# Patient Record
Sex: MALE | Race: WHITE | NOT HISPANIC OR LATINO | Employment: OTHER | URBAN - NONMETROPOLITAN AREA
[De-identification: names, ages, dates, MRNs, and addresses within clinical notes are randomized per-mention and may not be internally consistent; named-entity substitution may affect disease eponyms.]

---

## 2024-05-30 ENCOUNTER — OFFICE VISIT (OUTPATIENT)
Dept: SURGERY | Facility: CLINIC | Age: 64
End: 2024-05-30
Payer: COMMERCIAL

## 2024-05-30 ENCOUNTER — PATIENT ROUNDING (BHMG ONLY) (OUTPATIENT)
Dept: SURGERY | Facility: CLINIC | Age: 64
End: 2024-05-30
Payer: COMMERCIAL

## 2024-05-30 ENCOUNTER — HOSPITAL ENCOUNTER (OUTPATIENT)
Dept: CT IMAGING | Facility: HOSPITAL | Age: 64
Discharge: HOME | End: 2024-05-30
Admitting: SURGERY
Payer: COMMERCIAL

## 2024-05-30 ENCOUNTER — DOCUMENTATION (OUTPATIENT)
Dept: SURGERY | Facility: CLINIC | Age: 64
End: 2024-05-30

## 2024-05-30 VITALS
DIASTOLIC BLOOD PRESSURE: 83 MMHG | BODY MASS INDEX: 31.36 KG/M2 | HEIGHT: 71 IN | HEART RATE: 63 BPM | WEIGHT: 224 LBS | OXYGEN SATURATION: 98 % | SYSTOLIC BLOOD PRESSURE: 169 MMHG

## 2024-05-30 DIAGNOSIS — R10.32 INGUINODYNIA, BILATERAL: ICD-10-CM

## 2024-05-30 DIAGNOSIS — K40.20 BILATERAL INGUINAL HERNIA WITHOUT OBSTRUCTION OR GANGRENE, RECURRENCE NOT SPECIFIED: ICD-10-CM

## 2024-05-30 DIAGNOSIS — K40.20 BILATERAL INGUINAL HERNIA WITHOUT OBSTRUCTION OR GANGRENE, RECURRENCE NOT SPECIFIED: Primary | ICD-10-CM

## 2024-05-30 DIAGNOSIS — R10.31 INGUINODYNIA, BILATERAL: ICD-10-CM

## 2024-05-30 DIAGNOSIS — E66.9 CLASS 1 OBESITY WITH BODY MASS INDEX (BMI) OF 31.0 TO 31.9 IN ADULT, UNSPECIFIED OBESITY TYPE, UNSPECIFIED WHETHER SERIOUS COMORBIDITY PRESENT: ICD-10-CM

## 2024-05-30 DIAGNOSIS — K40.21 BILATERAL RECURRENT INGUINAL HERNIA WITHOUT OBSTRUCTION OR GANGRENE: ICD-10-CM

## 2024-05-30 LAB — CREAT BLDA-MCNC: 1.1 MG/DL (ref 0.6–1.3)

## 2024-05-30 PROCEDURE — 74177 CT ABD & PELVIS W/CONTRAST: CPT

## 2024-05-30 PROCEDURE — 82565 ASSAY OF CREATININE: CPT

## 2024-05-30 PROCEDURE — 25510000001 IOPAMIDOL 61 % SOLUTION: Performed by: SURGERY

## 2024-05-30 RX ORDER — FLUTICASONE PROPIONATE 50 MCG
1 SPRAY, SUSPENSION (ML) NASAL DAILY
COMMUNITY
Start: 2024-03-11

## 2024-05-30 RX ORDER — OMEPRAZOLE 20 MG/1
20 CAPSULE, DELAYED RELEASE ORAL DAILY
COMMUNITY
Start: 2024-03-11

## 2024-05-30 RX ORDER — MAGNESIUM OXIDE 400 MG/1
400 TABLET ORAL DAILY
COMMUNITY

## 2024-05-30 RX ORDER — ERGOCALCIFEROL (VITAMIN D2) 10 MCG
400 TABLET ORAL DAILY
COMMUNITY

## 2024-05-30 RX ORDER — SIMVASTATIN 20 MG
1 TABLET ORAL DAILY
COMMUNITY
Start: 2024-03-11 | End: 2024-06-09

## 2024-05-30 RX ORDER — ASPIRIN 81 MG/1
1 TABLET ORAL DAILY
COMMUNITY
Start: 2024-03-11 | End: 2024-06-09

## 2024-05-30 RX ORDER — DIPHENOXYLATE HYDROCHLORIDE AND ATROPINE SULFATE 2.5; .025 MG/1; MG/1
1 TABLET ORAL DAILY
COMMUNITY
Start: 2024-03-11 | End: 2024-06-09

## 2024-05-30 RX ORDER — PHENOL 1.4 %
600 AEROSOL, SPRAY (ML) MUCOUS MEMBRANE DAILY
COMMUNITY

## 2024-05-30 RX ORDER — IBUPROFEN 800 MG/1
800 TABLET ORAL EVERY 8 HOURS PRN
COMMUNITY
Start: 2024-03-11

## 2024-05-30 RX ADMIN — IOPAMIDOL 100 ML: 612 INJECTION, SOLUTION INTRAVENOUS at 13:15

## 2024-05-30 NOTE — PROGRESS NOTES
May 30, 2024    Hello, may I speak with Jose Alfredo Reyes?    My name is Mariela.      I am  with [facility]  [facility]  [address]  [facility] [id number] [phone].    Before we get started may I verify your date of birth? 1960    I am calling to officially welcome you to our practice and ask about your recent visit. Is this a good time to talk? yes    Tell me about your visit with us. What things went well?  Everyone was so nice and welcoming. Cyndee went above and beyond to help me with my insurance and get me scheduled. I truly appreciate her.       We're always looking for ways to make our patients' experiences even better. Do you have recommendations on ways we may improve?  no    Overall were you satisfied with your first visit to our practice? yes       I appreciate you taking the time to speak with me today. Is there anything else I can do for you? no      Thank you, and have a great day.

## 2024-06-01 NOTE — PROGRESS NOTES
Office New Patient History and Physical:     Referring Provider: Katarzyna Ortiz APRN  Primary Care Provider: Katarzyna Ortiz APRN    Chief Complaint   Patient presents with    Hernia       Subjective .       History of present illness:  Jose Alfredo Reyes is a 63 y.o. male who presents for bilateral inguinal pain after undergoing laparoscopic inguinal hernia repair in [place], [place]. He reports that he had a bilateral inguinal hernia repair last August, with a recurrent on the right side which required a revision repair in Nov. He also has pain on the left and believes that he has a mesh related complication causing the pain. He was told that he may have a meshoma. He reports that the pain worsens with exertion but rest does not completely alleviate the pain. The pain has significantly impacted his quality of life. He denies obstructive symptoms and no urinary changes.       History:  Past Medical History:   Diagnosis Date    High cholesterol     Hypertension    ,   Past Surgical History:   Procedure Laterality Date    HAND SURGERY Left     thumb    HERNIA REPAIR  2023    Aug and Nov    INCISION AND DRAINAGE HEMATOMA Left     UMBILICAL HERNIA REPAIR     , History reviewed. No pertinent family history.,   Social History     Tobacco Use    Smoking status: Former     Types: Cigarettes     Start date:      Quit date:      Years since quittin.4     Passive exposure: Past    Smokeless tobacco: Current   Vaping Use    Vaping status: Some Days    Substances: THC   Substance Use Topics    Alcohol use: Yes     Comment: Very rarely    Drug use: Yes     Frequency: 7.0 times per week     Types: Marijuana       Current Outpatient Medications:     aspirin 81 MG EC tablet, Take 1 tablet by mouth Daily., Disp: , Rfl:     calcium carbonate (OS-MAYA) 600 MG tablet, Take 1 tablet by mouth Daily., Disp: , Rfl:     fluticasone (FLONASE) 50 MCG/ACT nasal spray, 1 spray into the nostril(s) as directed by provider  "Daily., Disp: , Rfl:     ibuprofen (ADVIL,MOTRIN) 800 MG tablet, Take 1 tablet by mouth Every 8 (Eight) Hours As Needed for Mild Pain., Disp: , Rfl:     magnesium oxide (MAG-OX) 400 MG tablet, Take 1 tablet by mouth Daily., Disp: , Rfl:     multivitamin (THERAGRAN) tablet tablet, Take 1 tablet by mouth Daily., Disp: , Rfl:     omeprazole (priLOSEC) 20 MG capsule, Take 1 capsule by mouth Daily., Disp: , Rfl:     Probiotic Product (PROBIOTIC BLEND PO), Daily., Disp: , Rfl:     simvastatin (ZOCOR) 20 MG tablet, Take 1 tablet by mouth Daily., Disp: , Rfl:     TURMERIC CURCUMIN PO, Take  by mouth., Disp: , Rfl:     Vitamin D, Cholecalciferol, (CHOLECALCIFEROL) 10 MCG (400 UNIT) tablet, Take 1 tablet by mouth Daily., Disp: , Rfl:     Allergies:  Lisinopril      The following portions of the patient's history were reviewed and updated as appropriate: allergies, current medications, past family history, past medical history, past social history, past surgical history, and problem list.      Review of Systems  A comprehensive 14 point review of systems was performed and is negative unless otherwise noted      Objective   /83   Pulse 63   Ht 180.3 cm (71\")   Wt 102 kg (224 lb)   SpO2 98%   BMI 31.24 kg/m²     BMI followup discussion/instruction with patient: continue with current weight loss program, educational material, exercise counseling, nutrition counseling, and Information on healthy weight added to patient's after visit summary.      Physical Exam  Constitutional:       Appearance: Normal appearance. He is normal weight.      Comments: Uncomfortable appearing middle aged male , in no acute distress. Normal development, obese body habitus. Well nourished   HENT:      Head: Normocephalic and atraumatic.      Right Ear: External ear normal.      Left Ear: External ear normal.      Ears:      Comments: Hearing intact     Nose: Nose normal.      Comments: Nares patent, no septal deviation, no drainage     " Mouth/Throat:      Comments: Airway patent, dentition intact, mucus membranes moist  Eyes:      Extraocular Movements: Extraocular movements intact.      Conjunctiva/sclera: Conjunctivae normal.      Pupils: Pupils are equal, round, and reactive to light.      Comments: External eyelids grossly normal, vision intact, no scleral icterus   Neck:      Comments: Trachea midline  Cardiovascular:      Rate and Rhythm: Normal rate.      Comments: Normotensive, no JVD bilaterally  Pulmonary:      Effort: Pulmonary effort is normal.      Breath sounds: Normal breath sounds.      Comments: Normal respiratory rate  Abdominal:      General: Abdomen is flat.      Palpations: Abdomen is soft.      Comments: Well healed incisions. Non tender. No scars, hernias or masses.   Genitourinary:     Penis: Normal.       Testes: Normal.      Comments: Tender to palpation bilaterally in the inguinal region.  No obvious evidence of an inguinal hernia recurrence bilaterally based on exam  Musculoskeletal:         General: Normal range of motion.      Cervical back: Normal range of motion.      Comments: Strength intact. Ambulating without difficulty. Absent of trauma   Skin:     General: Skin is warm and dry.      Comments: Skin color is consistent with ethnicity   Neurological:      General: No focal deficit present.      Mental Status: He is alert and oriented to person, place, and time.   Psychiatric:         Mood and Affect: Mood normal.         Behavior: Behavior normal.         Thought Content: Thought content normal.         Judgment: Judgment normal.      Comments: Patient understands disease process and has decision making capacity.          Results:  Labs:  I personally reviewed all pertinent labs. No recent labs  Imaging: I personally reviewed all pertinent imaging studies. No new imaging   Pathology:        Assessment & Plan   Diagnoses and all orders for this visit:    1. Bilateral inguinal hernia without obstruction or gangrene,  recurrence not specified (Primary)  -     CT Abdomen Pelvis With Contrast; Future    2. Bilateral recurrent inguinal hernia without obstruction or gangrene    3. Inguinodynia, bilateral    63-year-old male with chronic post inguinal hernia repair pain.  He has come to me for mesh removal.  I have explained to the patient that mesh removal, regardless of a meshoma, mesh migration/displacement or any other complication, is not guaranteed to alleviate the pain and portends an increased risk of inguinal hernia recurrence.  I have recommended that he be referred to interventional pain management, for further evaluation and treatment during his workup, which he is not amenable to at this time. I will obtain his previous operative records and order a CT of the abdomen and pelvis to fully evaluate his anatomy.    I will see him back in 2 to 4 weeks.         Arash Molina MD, Merg[facility] with [facility]  General Surgery  6/1/2024  13:21 CDT    Please note that portions of this note were completed with a voice recognition program.

## 2024-06-04 ENCOUNTER — OFFICE VISIT (OUTPATIENT)
Dept: SURGERY | Facility: CLINIC | Age: 64
End: 2024-06-04
Payer: COMMERCIAL

## 2024-06-04 VITALS
DIASTOLIC BLOOD PRESSURE: 80 MMHG | SYSTOLIC BLOOD PRESSURE: 128 MMHG | HEART RATE: 67 BPM | WEIGHT: 228.2 LBS | HEIGHT: 71 IN | BODY MASS INDEX: 31.95 KG/M2 | OXYGEN SATURATION: 95 %

## 2024-06-04 DIAGNOSIS — R10.32 INGUINODYNIA, BILATERAL: Primary | ICD-10-CM

## 2024-06-04 DIAGNOSIS — Z87.19 HISTORY OF BILATERAL INGUINAL HERNIA REPAIR: ICD-10-CM

## 2024-06-04 DIAGNOSIS — R10.31 INGUINODYNIA, RIGHT: ICD-10-CM

## 2024-06-04 DIAGNOSIS — Z98.890 HISTORY OF BILATERAL INGUINAL HERNIA REPAIR: ICD-10-CM

## 2024-06-04 DIAGNOSIS — R10.31 INGUINODYNIA, BILATERAL: Primary | ICD-10-CM

## 2024-06-04 DIAGNOSIS — E66.9 CLASS 1 OBESITY WITH BODY MASS INDEX (BMI) OF 31.0 TO 31.9 IN ADULT, UNSPECIFIED OBESITY TYPE, UNSPECIFIED WHETHER SERIOUS COMORBIDITY PRESENT: ICD-10-CM

## 2024-06-05 NOTE — PROGRESS NOTES
OFFICE FOLLOW UP VISIT    Referring Provider: No ref. provider found  Primary Care Provider: Katarzyna Ortiz APRN    Chief Complaint   Patient presents with    Follow-up       Subjective .       HPI    The patient is following up after undergoing CT of the abdomen and pelvis. The patient reports that his pain is unchanged. Right sided pain is worse than the left.  He is able to function but the pain is significantly affecting his quality of life. The left sided groin pain is tolerable.    History:  Past Medical History:   Diagnosis Date    High cholesterol     Hypertension    ,   Past Surgical History:   Procedure Laterality Date    HAND SURGERY Left     thumb    HERNIA REPAIR  2023    Aug and Nov    INCISION AND DRAINAGE HEMATOMA Left     UMBILICAL HERNIA REPAIR     , History reviewed. No pertinent family history.,   Social History     Tobacco Use    Smoking status: Former     Types: Cigarettes     Start date:      Quit date:      Years since quittin.4     Passive exposure: Past    Smokeless tobacco: Current   Vaping Use    Vaping status: Never Used   Substance Use Topics    Alcohol use: Yes     Comment: Very rarely    Drug use: Yes     Frequency: 7.0 times per week     Types: Marijuana       Current Outpatient Medications:     aspirin 81 MG EC tablet, Take 1 tablet by mouth Daily., Disp: , Rfl:     calcium carbonate (OS-MAYA) 600 MG tablet, Take 1 tablet by mouth Daily., Disp: , Rfl:     fluticasone (FLONASE) 50 MCG/ACT nasal spray, 1 spray into the nostril(s) as directed by provider Daily., Disp: , Rfl:     ibuprofen (ADVIL,MOTRIN) 800 MG tablet, Take 1 tablet by mouth Every 8 (Eight) Hours As Needed for Mild Pain., Disp: , Rfl:     magnesium oxide (MAG-OX) 400 MG tablet, Take 1 tablet by mouth Daily., Disp: , Rfl:     multivitamin (THERAGRAN) tablet tablet, Take 1 tablet by mouth Daily., Disp: , Rfl:     omeprazole (priLOSEC) 20 MG capsule, Take 1 capsule by mouth Daily., Disp: , Rfl:      "Probiotic Product (PROBIOTIC BLEND PO), Daily., Disp: , Rfl:     simvastatin (ZOCOR) 20 MG tablet, Take 1 tablet by mouth Daily., Disp: , Rfl:     TURMERIC CURCUMIN PO, Take  by mouth., Disp: , Rfl:     Vitamin D, Cholecalciferol, (CHOLECALCIFEROL) 10 MCG (400 UNIT) tablet, Take 1 tablet by mouth Daily., Disp: , Rfl:     Allergies:  Lisinopril      The following portions of the patient's history were reviewed and updated as appropriate: allergies, current medications, past family history, past medical history, past social history, past surgical history, and problem list.      Review of Systems  A comprehensive 14 point review of systems was performed and is negative unless otherwise noted      Objective   /80 (BP Location: Right arm, Patient Position: Sitting, Cuff Size: Adult)   Pulse 67   Ht 180.3 cm (70.98\")   Wt 104 kg (228 lb 3.2 oz)   SpO2 95%   BMI 31.84 kg/m²     BMI followup discussion/instruction with patient: continue with current weight loss program, educational material, and Information on healthy weight added to patient's after visit summary.      Physical Exam  Constitutional:       Appearance: Normal appearance. He is normal weight.      Comments: Middle aged male , in no acute distress. Normal development, normal body habitus. Well nourished   HENT:      Head: Normocephalic and atraumatic.      Right Ear: External ear normal.      Left Ear: External ear normal.      Ears:      Comments: Hearing intact     Nose: Nose normal.      Comments: Nares patent, no septal deviation, no drainage     Mouth/Throat:      Comments: Airway patent, dentition intact, mucus membranes moist  Eyes:      Extraocular Movements: Extraocular movements intact.      Conjunctiva/sclera: Conjunctivae normal.      Pupils: Pupils are equal, round, and reactive to light.      Comments: External eyelids grossly normal, vision intact, no scleral icterus   Neck:      Comments: Trachea midline  Cardiovascular:      Rate and " Rhythm: Normal rate.      Comments: Normotensive, no JVD bilaterally  Pulmonary:      Effort: Pulmonary effort is normal.      Breath sounds: Normal breath sounds.      Comments: Normal respiratory rate  Abdominal:      General: Abdomen is flat.      Palpations: Abdomen is soft.      Comments: Non tender. No scars, hernias or masses.   Musculoskeletal:         General: Normal range of motion.      Cervical back: Normal range of motion.      Comments: Strength intact. Ambulating without difficulty. Absent of trauma   Skin:     General: Skin is warm and dry.      Comments: Skin color is consistent with ethnicity   Neurological:      General: No focal deficit present.      Mental Status: He is alert and oriented to person, place, and time.   Psychiatric:         Mood and Affect: Mood normal.         Behavior: Behavior normal.         Thought Content: Thought content normal.         Judgment: Judgment normal.      Comments: Patient understands disease process and has decision making capacity.        Results:  Labs:  I personally reviewed all pertinent labs. No recent labs  Imaging: I personally reviewed all pertinent imaging studies. CT of the abdomen and pelvis show no evidence of recurrence but does show some inflammatory changes on the left inguinal space that could represent a meshoma.  Pathology:      Assessment & Plan   Diagnoses and all orders for this visit:    1. Inguinodynia, bilateral (Primary)    2. Class 1 obesity with body mass index (BMI) of 31.0 to 31.9 in adult, unspecified obesity type, unspecified whether serious comorbidity present    3. History of bilateral inguinal hernia repair    4. Inguinodynia, right      No evidence of recurrence on either side.  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 Right sided inguinodynia is worse than the left. I have explained to the patient that mesh removal may be successful and alleviating his pain, but also has a high risk of ongoing or  worsening pain, and inguinal hernia recurrence.  I have strongly recommended at least trying interventional pain management first, for possibly an image guided nerve ablation.  He will consider seeing interventional pain management.  I will refer him to Dr. Janak Littlejohn for further evaluation and treatment.    I will see him back in 3 months.  If we have eventually exhausted all nonsurgical interventions, I will continue discussions for mesh explantation.         Aarsh Molina MD, FACS  General Surgery  6/4/2024  21:51 CDT    Please note that portions of this note were completed with a voice recognition program.